# Patient Record
Sex: MALE | URBAN - METROPOLITAN AREA
[De-identification: names, ages, dates, MRNs, and addresses within clinical notes are randomized per-mention and may not be internally consistent; named-entity substitution may affect disease eponyms.]

---

## 2022-04-06 ENCOUNTER — APPOINTMENT (RX ONLY)
Dept: URBAN - METROPOLITAN AREA CLINIC 374 | Facility: CLINIC | Age: 67
Setting detail: DERMATOLOGY
End: 2022-04-06

## 2022-04-06 DIAGNOSIS — L30.9 DERMATITIS, UNSPECIFIED: ICD-10-CM | Status: RESOLVING

## 2022-04-06 PROCEDURE — 99203 OFFICE O/P NEW LOW 30 MIN: CPT

## 2022-04-06 PROCEDURE — ? COUNSELING

## 2022-04-06 PROCEDURE — ? PRESCRIPTION MEDICATION MANAGEMENT

## 2022-04-06 PROCEDURE — ? PHOTO-DOCUMENTATION

## 2022-04-06 PROCEDURE — ? PRESCRIPTION

## 2022-04-06 PROCEDURE — ? ADDITIONAL NOTES

## 2022-04-06 RX ORDER — PRAMOXINE HYDROCHLORIDE 10 MG/ML
1 LOTION TOPICAL QDAY
Qty: 222 | Refills: 3 | Status: ERX | COMMUNITY
Start: 2022-04-06

## 2022-04-06 RX ADMIN — PRAMOXINE HYDROCHLORIDE 1: 10 LOTION TOPICAL at 00:00

## 2022-04-06 ASSESSMENT — LOCATION SIMPLE DESCRIPTION DERM: LOCATION SIMPLE: ABDOMEN

## 2022-04-06 ASSESSMENT — LOCATION DETAILED DESCRIPTION DERM
LOCATION DETAILED: EPIGASTRIC SKIN
LOCATION DETAILED: PERIUMBILICAL SKIN

## 2022-04-06 ASSESSMENT — LOCATION ZONE DERM: LOCATION ZONE: TRUNK

## 2022-04-06 NOTE — PROCEDURE: PRESCRIPTION MEDICATION MANAGEMENT
HPI  Brigitte Brannon is a 27 y.o. male who presents to follow-up on acute kidney injury. Had a mildly elevated creatinine 1 month ago that I wanted to recheck. He also started taking Allegra and Nasacort. Was taking the Allegra for about 2 weeks and the Nasacort multiple times a day but did not see any benefit to his nasal congestion. Actually got to the point where his wife had to sleep in another room last night because he was so congested. Has tried a few prescriptions in the past she recalls but does not remember what they were. Took Afrin at some point in the past and noticed some benefit. Had a workup for his globus sensation. Had a EGD that found a Schatzki's ring that was dilated. He was placed on a PPI for this. The omeprazole has been making him dizzy. This is a listed side effect with a less than 2% incidence. PMHx:  No past medical history on file. Meds:   Current Outpatient Prescriptions   Medication Sig Dispense Refill    esomeprazole magnesium (NEXIUM 24HR) 20 mg TbEC Take 20 mg by mouth daily. Indications: EROSIVE ESOPHAGITIS 90 Tab 1       Allergies:   No Known Allergies    Smoker:  History   Smoking Status    Never Smoker   Smokeless Tobacco    Never Used       ETOH:   History   Alcohol Use No       FH:   Family History   Problem Relation Age of Onset    Cancer Mother        ROS:  As listed in HPI. In addition:  Constitutional:   No headache, fever, fatigue, weight loss or weight gain      Eyes:   No redness, pruritis, pain, visual changes, swelling, or discharge      Ears:    No pain, loss or changes in hearing     Cardiac:    No chest pain      Resp:   No cough or shortness of breath      Neuro   No loss of consciousness, dizziness, seizures      Physical Exam:  Blood pressure 125/76, pulse (!) 54, temperature 98 °F (36.7 °C), resp. rate 14, height 5' 10\" (1.778 m), weight 166 lb (75.3 kg), SpO2 98 %. GEN: No apparent distress.  Alert and oriented and responds to all questions appropriately. EYES:  Conjunctiva clear; pupils round and reactive to light; extraocular movements are intact. EAR: External ears are normal.  Tympanic membranes are clear and without effusion. NOSE: Turbinates are congested more so on the right than the left  OROPHYARYNX: No oral lesions or exudates. NECK:  Supple; no masses; thyroid normal           LUNGS: Respirations unlabored; clear to auscultation bilaterally  CARDIOVASCULAR: Regular rate and rhythm without murmurs   ABDOMEN: Soft; nontender; nondistended; normoactive bowel sounds; no masses or organomegaly  NEUROLOGIC:  No focal neurologic deficits. Strength and sensation grossly intact. Coordination and gait grossly intact. EXT: Well perfused. No edema. SKIN: No obvious rashes. Assessment and Plan     Acute kidney injury  Typically does not drink a lot of fluid during the day. Drank 2 glasses of water with his medicines this morning. Check a BMP    GERD with esophagitis, Schatzki's ring now dilated. Feels a little better with regard to his globus sensation however he is consistently getting side effects from the omeprazole. Never gets dizzy but is now experiencing dizziness on the days when he takes it. This is a listed side effect albeit a rare one. Try Nexium as a next step. Brief research suggests that all PPIs have this side effect    Nasal congestion  Unfortunate that he did not get any benefit at all from nasal steroid. I think we will go ahead and skip go and refer to ENT since this has been going on for so long      ICD-10-CM ICD-9-CM    1. Acute kidney injury ([facility] [address] B42.6 997.4 METABOLIC PANEL, BASIC   2. Schatzki's ring K22.2 750.3 esomeprazole magnesium (NEXIUM 24HR) 20 mg TbEC   3. GERD with esophagitis K21.0 530.11 esomeprazole magnesium (NEXIUM 24HR) 20 mg TbEC   4. Chronic nasal congestion R09.81 478.19 REFERRAL TO ENT-OTOLARYNGOLOGY       AVS given.  Pt expressed understanding of instructions Render In Strict Bullet Format?: No

## 2023-03-18 NOTE — HPI: RASH
What Type Of Note Output Would You Prefer (Optional)?: Standard Output
How Severe Is Your Rash?: mild
Is This A New Presentation, Or A Follow-Up?: Rash
Additional History: Patient has been to two different [facility] and the PCP where he has been told the rash is eczema and has been prescribed different medications that did not work. Patient bought an over the counter called Dexamethasone acetate cream that did help.
No assistance needed